# Patient Record
Sex: MALE | Race: WHITE | NOT HISPANIC OR LATINO | Employment: OTHER | URBAN - NONMETROPOLITAN AREA
[De-identification: names, ages, dates, MRNs, and addresses within clinical notes are randomized per-mention and may not be internally consistent; named-entity substitution may affect disease eponyms.]

---

## 2017-01-10 RX ORDER — OXYCODONE AND ACETAMINOPHEN 10; 325 MG/1; MG/1
1 TABLET ORAL EVERY 6 HOURS PRN
Qty: 120 TABLET | Refills: 0 | Status: SHIPPED | OUTPATIENT
Start: 2017-01-10 | End: 2017-02-07 | Stop reason: SDUPTHER

## 2017-02-07 RX ORDER — OXYCODONE AND ACETAMINOPHEN 10; 325 MG/1; MG/1
1 TABLET ORAL EVERY 6 HOURS PRN
Qty: 120 TABLET | Refills: 0 | Status: SHIPPED | OUTPATIENT
Start: 2017-02-07 | End: 2017-03-09 | Stop reason: SDUPTHER

## 2017-02-16 RX ORDER — QUETIAPINE FUMARATE 50 MG/1
50 TABLET, FILM COATED ORAL NIGHTLY
Qty: 30 TABLET | Refills: 5 | Status: SHIPPED | OUTPATIENT
Start: 2017-02-16 | End: 2017-09-06 | Stop reason: SDUPTHER

## 2017-02-23 RX ORDER — AZITHROMYCIN 250 MG/1
TABLET, FILM COATED ORAL
Qty: 6 TABLET | Refills: 0 | Status: SHIPPED | OUTPATIENT
Start: 2017-02-23 | End: 2017-03-09

## 2017-03-09 ENCOUNTER — OFFICE VISIT (OUTPATIENT)
Dept: FAMILY MEDICINE CLINIC | Facility: CLINIC | Age: 72
End: 2017-03-09

## 2017-03-09 VITALS
SYSTOLIC BLOOD PRESSURE: 122 MMHG | HEIGHT: 70 IN | BODY MASS INDEX: 21.88 KG/M2 | DIASTOLIC BLOOD PRESSURE: 80 MMHG | WEIGHT: 152.8 LBS

## 2017-03-09 DIAGNOSIS — G20 PARKINSON'S DISEASE (HCC): ICD-10-CM

## 2017-03-09 DIAGNOSIS — D51.0 PERNICIOUS ANEMIA: Primary | ICD-10-CM

## 2017-03-09 DIAGNOSIS — M25.50 MULTIPLE JOINT PAIN: ICD-10-CM

## 2017-03-09 PROCEDURE — 99214 OFFICE O/P EST MOD 30 MIN: CPT | Performed by: FAMILY MEDICINE

## 2017-03-09 RX ORDER — CARVEDILOL 3.12 MG/1
3.12 TABLET ORAL 2 TIMES DAILY WITH MEALS
Qty: 60 TABLET | Refills: 5 | Status: SHIPPED | OUTPATIENT
Start: 2017-03-09 | End: 2017-08-01 | Stop reason: SDUPTHER

## 2017-03-09 RX ORDER — OXYCODONE AND ACETAMINOPHEN 10; 325 MG/1; MG/1
1 TABLET ORAL EVERY 6 HOURS PRN
Qty: 120 TABLET | Refills: 0 | Status: SHIPPED | OUTPATIENT
Start: 2017-03-09 | End: 2017-04-04 | Stop reason: SDUPTHER

## 2017-03-09 NOTE — PROGRESS NOTES
Subjective   Edashkan Gentile is a 71 y.o. male.  He is here for chronic parkinsonism, chronic joint pain including back and shoulder pain.  Overall his condition continues to decline.  He is receiving home health services.  He has been on B12 supplements for years and is still consistently anemic.  He has been taking oral B12, may need B12 shots however as his mental and physical status decline.  His wife says he has a good appetite but has continued to have weight loss or at least poor weight gain.  He is unable to this point to really carry out any of his activities of daily living.  She said he may try to feed himself a bite or 2 but she has to finish feeding him his meals.  He had some recent medication changes in the hospital.  There was some confusion about exactly what was happening is she says he was prepped and shaved for a coronary artery catheterization but when he got to the Cath Lab evidently the procedure was canceled.  She has an appointment scheduled with his regular cardiologist    Some of his blood pressure medications were changed including the discontinuation of enalapril and Lasix and he was prescribed carvedilol.`    History of Present Illness     The following portions of the patient's history were reviewed and updated as appropriate: allergies, current medications, past family history, past medical history, past social history, past surgical history and problem list.    Review of Systems   Constitutional: Negative for activity change, appetite change, diaphoresis, fatigue, fever and unexpected weight change.   HENT: Negative for congestion, ear pain, hearing loss, sinus pressure, sore throat, tinnitus, trouble swallowing and voice change.    Eyes: Negative.    Respiratory: Negative.    Cardiovascular: Negative.    Gastrointestinal: Negative for abdominal distention, abdominal pain, blood in stool, constipation, diarrhea, nausea and vomiting.   Endocrine: Negative.    Genitourinary:  Negative for decreased urine volume, dysuria, frequency, hematuria and urgency.   Musculoskeletal: Negative.    Skin: Negative.    Allergic/Immunologic: Negative.    Neurological: Negative for dizziness, tremors, seizures, syncope, speech difficulty, weakness, numbness and headaches.   Hematological: Negative.    Psychiatric/Behavioral: Negative for dysphoric mood and sleep disturbance. The patient is not nervous/anxious.      Past Medical History   Diagnosis Date   • Acute upper respiratory infection    • Adhesive capsulitis of shoulder    • Backache    • Cervical radiculopathy    • Degeneration of lumbar intervertebral disc    • Dementia    • Drug-induced orofacial dyskinesia    • Encounter for screening for malignant neoplasm of prostate    • Hyperlipidemia    • Hypotension    • Low back pain    • Mouth ulcer    • Osteoarthritis of acromioclavicular joint    • Parkinson's disease    • Upper respiratory infection    • Urinary incontinence    • Vitamin B12 deficiency (non anemic)    • Vitamin D deficiency      No Known Allergies    Current Outpatient Prescriptions:   •  aspirin 81 MG EC tablet, Take 81 mg by mouth daily., Disp: , Rfl:   •  carbidopa-levodopa CR (SINEMET CR)  MG per CR tablet, Take 1 tablet by mouth 4 (four) times a day., Disp: , Rfl:   •  clopidogrel (PLAVIX) 75 MG tablet, Take 75 mg by mouth daily., Disp: , Rfl:   •  Cyanocobalamin (VITAMIN B-12 CR PO), Take  by mouth daily., Disp: , Rfl:   •  diclofenac (VOLTAREN) 1 % gel gel, Apply 4 g topically 4 (four) times a day. Apply to affected joints., Disp: , Rfl:   •  finasteride (PROSCAR) 5 MG tablet, , Disp: , Rfl: 11  •  lansoprazole (PREVACID) 30 MG capsule, TAKE 1 CAPSULE BY MOUTH EVERY DAY, Disp: 30 capsule, Rfl: 5  •  lidocaine (LIDODERM) 5 %, Place  on the skin. wear up to 3 patches daily for 12 hours, Disp: , Rfl:   •  linaclotide (LINZESS) 290 MCG capsule capsule, Take 290 mcg by mouth Daily., Disp: 30 capsule, Rfl: 5  •   methylcellulose, Laxative, (CITRUCEL) 500 MG tablet tablet, Take 1 tablet by mouth daily., Disp: , Rfl:   •  nitroglycerin (NITROSTAT) 0.4 MG SL tablet, Place  under the tongue. 1 tablet(s) sublingual as needed for chest pain (may repeat every 5 minutes but seek medical help if pain persists after 3 tablets), Disp: , Rfl:   •  olopatadine (PATADAY) 0.2 % solution ophthalmic solution, Administer 1 drop to both eyes 2 (two) times a day., Disp: , Rfl:   •  ondansetron (ZOFRAN) 4 MG tablet, Take 4-8 mg by mouth every 4 (four) hours as needed for nausea., Disp: , Rfl:   •  oxyCODONE-acetaminophen (PERCOCET)  MG per tablet, Take 1 tablet by mouth Every 6 (Six) Hours As Needed for moderate pain (4-6)., Disp: 120 tablet, Rfl: 0  •  pravastatin (PRAVACHOL) 40 MG tablet, TAKE 1 TABLET BY MOUTH EVERY OTHER DAY, Disp: 15 tablet, Rfl: 5  •  QUEtiapine (SEROquel) 50 MG tablet, Take 1 tablet by mouth Every Night., Disp: 30 tablet, Rfl: 5  •  silodosin (RAPAFLO) 8 MG capsule capsule, Take  by mouth., Disp: , Rfl:   •  vitamin D (ERGOCALCIFEROL) 91904 UNITS capsule capsule, TAKE 1 CAPSULE BY MOUTH ONCE A WEEK, Disp: 4 capsule, Rfl: 5  •  carvedilol (COREG) 3.125 MG tablet, Take 1 tablet by mouth 2 (Two) Times a Day With Meals., Disp: 60 tablet, Rfl: 5    Objective   Physical Exam   Constitutional: He is oriented to person, place, and time. He appears well-developed and well-nourished. No distress.   HENT:   Head: Normocephalic and atraumatic.   Nose: Nose normal.   Mouth/Throat: Oropharynx is clear and moist.   Eyes: Conjunctivae and EOM are normal. Pupils are equal, round, and reactive to light.   Neck: Normal range of motion. Neck supple. No JVD present. No tracheal deviation present. No thyromegaly present.   Cardiovascular: Normal rate, regular rhythm, normal heart sounds and intact distal pulses.    No murmur heard.  Pulmonary/Chest: Effort normal and breath sounds normal. He has no wheezes. He exhibits no tenderness.    Abdominal: Soft. Bowel sounds are normal. He exhibits no distension and no mass. There is no tenderness.   Musculoskeletal: Normal range of motion. He exhibits no edema or tenderness.   Lymphadenopathy:     He has no cervical adenopathy.   Neurological: He is alert and oriented to person, place, and time. He exhibits normal muscle tone. Coordination normal.   Skin: Skin is warm and dry. No rash noted. There is erythema. No pallor.   Psychiatric: He has a normal mood and affect.   Nursing note and vitals reviewed.      Assessment/Plan   Landry was seen today for follow-up.    Diagnoses and all orders for this visit:    Pernicious anemia    Parkinson's disease    Multiple joint pain    Other orders  -     oxyCODONE-acetaminophen (PERCOCET)  MG per tablet; Take 1 tablet by mouth Every 6 (Six) Hours As Needed for moderate pain (4-6).  -     carvedilol (COREG) 3.125 MG tablet; Take 1 tablet by mouth 2 (Two) Times a Day With Meals.    The patient has read and signed the [facility] Controlled Substance Contract.  I will continue to see patient for regular follow up appointments. Patient is well controlled on the medication.  JOSE RAFAEL has been reviewed by me and is updated every 3 months. The patient is aware of the potential for addiction and dependence.     Continue pain management with the Percocet.  His wife does dispense them when needed    We'll continue with carvedilol until he sees his regular cardiologist who may wish to make some changes.    I recommend that he have the B12 shots for the chronic macrocytic or pernicious anemia.  He has been on long-term PPIs and still needs to be and this may be part of the source of the pernicious anemia.  Nonetheless the oral supplements that are sublingual are more difficult for him to take now given his Parkinson's disease.  He has a constant rolling and resting of the tongue and also poor understanding both of which make it difficult for him to take a sublingual  tablet.    I recommend a transport chair for him to be taken back and forth to doctor's visits and other places where transportation as necessary.  He has poor upper body muscle control and is at a significant fall risk and may also slip out of regular chairs at times.  He has some tremoring related to the parkinsonism as well and all of these things make it difficult for him to sit in a regular car seat with the seatbelt safely

## 2017-04-04 RX ORDER — OXYCODONE AND ACETAMINOPHEN 10; 325 MG/1; MG/1
1 TABLET ORAL EVERY 6 HOURS PRN
Qty: 120 TABLET | Refills: 0 | Status: SHIPPED | OUTPATIENT
Start: 2017-04-04 | End: 2017-05-01 | Stop reason: SDUPTHER

## 2017-04-05 RX ORDER — PRAVASTATIN SODIUM 40 MG
TABLET ORAL
Qty: 15 TABLET | Refills: 5 | Status: SHIPPED | OUTPATIENT
Start: 2017-04-05 | End: 2017-10-09 | Stop reason: SDUPTHER

## 2017-04-05 RX ORDER — CYANOCOBALAMIN 1000 UG/ML
INJECTION, SOLUTION INTRAMUSCULAR; SUBCUTANEOUS
Qty: 2 ML | Refills: 1 | Status: SHIPPED | OUTPATIENT
Start: 2017-04-05 | End: 2017-06-19 | Stop reason: SDUPTHER

## 2017-05-02 RX ORDER — OXYCODONE AND ACETAMINOPHEN 10; 325 MG/1; MG/1
1 TABLET ORAL EVERY 6 HOURS PRN
Qty: 120 TABLET | Refills: 0 | Status: SHIPPED | OUTPATIENT
Start: 2017-05-02 | End: 2017-06-08 | Stop reason: SDUPTHER

## 2017-05-03 RX ORDER — LANSOPRAZOLE 30 MG/1
CAPSULE, DELAYED RELEASE ORAL
Qty: 30 CAPSULE | Refills: 5 | Status: SHIPPED | OUTPATIENT
Start: 2017-05-03 | End: 2017-12-01 | Stop reason: SDUPTHER

## 2017-05-03 RX ORDER — ERGOCALCIFEROL 1.25 MG/1
CAPSULE ORAL
Qty: 4 CAPSULE | Refills: 5 | Status: SHIPPED | OUTPATIENT
Start: 2017-05-03 | End: 2017-11-03 | Stop reason: SDUPTHER

## 2017-06-08 ENCOUNTER — OFFICE VISIT (OUTPATIENT)
Dept: FAMILY MEDICINE CLINIC | Facility: CLINIC | Age: 72
End: 2017-06-08

## 2017-06-08 VITALS
SYSTOLIC BLOOD PRESSURE: 126 MMHG | HEIGHT: 70 IN | DIASTOLIC BLOOD PRESSURE: 70 MMHG | WEIGHT: 156.4 LBS | BODY MASS INDEX: 22.39 KG/M2

## 2017-06-08 DIAGNOSIS — G20 PARKINSON'S DISEASE (HCC): ICD-10-CM

## 2017-06-08 DIAGNOSIS — M51.36 DEGENERATION OF LUMBAR INTERVERTEBRAL DISC: Primary | ICD-10-CM

## 2017-06-08 PROCEDURE — 99214 OFFICE O/P EST MOD 30 MIN: CPT | Performed by: FAMILY MEDICINE

## 2017-06-08 RX ORDER — OXYCODONE AND ACETAMINOPHEN 10; 325 MG/1; MG/1
1 TABLET ORAL EVERY 6 HOURS PRN
Qty: 120 TABLET | Refills: 0 | Status: SHIPPED | OUTPATIENT
Start: 2017-06-08 | End: 2017-06-29 | Stop reason: SDUPTHER

## 2017-06-08 NOTE — PROGRESS NOTES
Subjective   Edashkan Gentile is a 72 y.o. male who presents to the office for follow-up on arthritis and lumbar degenerative disc disease.  Needs refill of pain medication, which his wife administers to him.  His Parkinson's symptoms have progressed.  His wife wonders if the Sinemet should be increased but has an appointment scheduled with his neurologist to discuss this.  He continues to have very poor coordination    History of Present Illness     The following portions of the patient's history were reviewed and updated as appropriate: allergies, current medications, past family history, past medical history, past social history, past surgical history and problem list.    Review of Systems   Constitutional: Positive for fatigue. Negative for activity change, appetite change, diaphoresis, fever and unexpected weight change.   HENT: Negative for congestion, ear pain, hearing loss, sinus pressure, sore throat, tinnitus, trouble swallowing and voice change.    Eyes: Negative.    Respiratory: Negative.    Cardiovascular: Negative.    Gastrointestinal: Negative for abdominal distention, abdominal pain, blood in stool, constipation, diarrhea, nausea and vomiting.   Endocrine: Negative.    Genitourinary: Negative for decreased urine volume, dysuria, frequency, hematuria and urgency.   Musculoskeletal: Positive for arthralgias, back pain, gait problem and joint swelling.   Skin: Negative.  Negative for rash.   Allergic/Immunologic: Negative.    Neurological: Positive for tremors and weakness. Negative for dizziness, seizures, syncope, speech difficulty, numbness and headaches.   Hematological: Negative.    Psychiatric/Behavioral: Positive for confusion, decreased concentration and dysphoric mood. Negative for sleep disturbance. The patient is nervous/anxious.        Objective   Physical Exam   Constitutional: He appears well-developed and well-nourished. No distress.   HENT:   Head: Normocephalic and atraumatic.    Nose: Nose normal.   Mouth/Throat: Oropharynx is clear and moist.   Eyes: Conjunctivae and EOM are normal. Pupils are equal, round, and reactive to light.   Neck: Normal range of motion. Neck supple. No JVD present. No tracheal deviation present. No thyromegaly present.   Cardiovascular: Normal rate, regular rhythm, normal heart sounds and intact distal pulses.    No murmur heard.  Pulmonary/Chest: Effort normal and breath sounds normal. He has no wheezes. He exhibits no tenderness.   Abdominal: Soft. Bowel sounds are normal. He exhibits no distension and no mass. There is no tenderness.   Musculoskeletal: He exhibits no edema or tenderness.   Cogwheel rigidity, gait stiff, shuffling   Lymphadenopathy:     He has no cervical adenopathy.   Neurological: He is alert. He exhibits normal muscle tone. Coordination normal.   Skin: Skin is warm and dry. No rash noted. No erythema. No pallor.   Psychiatric:   Flat affect   Nursing note and vitals reviewed.      Assessment/Plan   Landry was seen today for follow-up and med refill.    Diagnoses and all orders for this visit:    Degeneration of lumbar intervertebral disc    Parkinson's disease    Other orders  -     oxyCODONE-acetaminophen (PERCOCET)  MG per tablet; Take 1 tablet by mouth Every 6 (Six) Hours As Needed for Moderate Pain (4-6).    The patient has read and signed the [facility] Controlled Substance Contract.  I will continue to see patient for regular follow up appointments. Patient is well controlled on the medication.  JOSE RAFAEL has been reviewed by me and is updated every 3 months. The patient is aware of the potential for addiction and dependence.   His wife will continue to Mr. the pain medicine to him as needed.  They will follow-up with neurology regarding progressive Parkinson's issues and Sinemet dosage       ]

## 2017-06-19 RX ORDER — CYANOCOBALAMIN 1000 UG/ML
INJECTION, SOLUTION INTRAMUSCULAR; SUBCUTANEOUS
Qty: 2 ML | Refills: 5 | Status: SHIPPED | OUTPATIENT
Start: 2017-06-19 | End: 2017-07-13 | Stop reason: SDUPTHER

## 2017-06-29 RX ORDER — OXYCODONE AND ACETAMINOPHEN 10; 325 MG/1; MG/1
1 TABLET ORAL EVERY 6 HOURS PRN
Qty: 120 TABLET | Refills: 0 | Status: SHIPPED | OUTPATIENT
Start: 2017-06-29 | End: 2017-08-01 | Stop reason: SDUPTHER

## 2017-07-13 RX ORDER — CYANOCOBALAMIN 1000 UG/ML
1000 INJECTION, SOLUTION INTRAMUSCULAR; SUBCUTANEOUS
Qty: 2 ML | Refills: 5 | Status: SHIPPED | OUTPATIENT
Start: 2017-07-13 | End: 2017-12-01 | Stop reason: SDUPTHER

## 2017-08-01 RX ORDER — OXYCODONE AND ACETAMINOPHEN 10; 325 MG/1; MG/1
1 TABLET ORAL EVERY 6 HOURS PRN
Qty: 120 TABLET | Refills: 0 | Status: SHIPPED | OUTPATIENT
Start: 2017-08-01 | End: 2017-08-28 | Stop reason: SDUPTHER

## 2017-08-01 RX ORDER — CARVEDILOL 3.12 MG/1
TABLET ORAL
Qty: 60 TABLET | Refills: 5 | Status: SHIPPED | OUTPATIENT
Start: 2017-08-01 | End: 2018-01-29 | Stop reason: SDUPTHER

## 2017-08-28 RX ORDER — OXYCODONE AND ACETAMINOPHEN 10; 325 MG/1; MG/1
1 TABLET ORAL EVERY 6 HOURS PRN
Qty: 120 TABLET | Refills: 0 | Status: SHIPPED | OUTPATIENT
Start: 2017-08-28 | End: 2017-09-26 | Stop reason: SDUPTHER

## 2017-09-06 ENCOUNTER — OFFICE VISIT (OUTPATIENT)
Dept: FAMILY MEDICINE CLINIC | Facility: CLINIC | Age: 72
End: 2017-09-06

## 2017-09-06 VITALS
DIASTOLIC BLOOD PRESSURE: 80 MMHG | BODY MASS INDEX: 22.33 KG/M2 | SYSTOLIC BLOOD PRESSURE: 138 MMHG | WEIGHT: 156 LBS | HEIGHT: 70 IN

## 2017-09-06 DIAGNOSIS — L60.0 INGROWN RIGHT BIG TOENAIL: Primary | ICD-10-CM

## 2017-09-06 DIAGNOSIS — G20 PARKINSON'S DISEASE (HCC): ICD-10-CM

## 2017-09-06 DIAGNOSIS — G62.9 NEUROPATHY: ICD-10-CM

## 2017-09-06 PROCEDURE — 99214 OFFICE O/P EST MOD 30 MIN: CPT | Performed by: FAMILY MEDICINE

## 2017-09-06 RX ORDER — GABAPENTIN 100 MG/1
100 CAPSULE ORAL 3 TIMES DAILY
Qty: 90 CAPSULE | Refills: 2 | Status: SHIPPED | OUTPATIENT
Start: 2017-09-06 | End: 2017-12-06 | Stop reason: SDUPTHER

## 2017-09-06 RX ORDER — QUETIAPINE FUMARATE 50 MG/1
TABLET, FILM COATED ORAL
Qty: 30 TABLET | Refills: 5 | Status: SHIPPED | OUTPATIENT
Start: 2017-09-06 | End: 2018-02-28 | Stop reason: SDUPTHER

## 2017-09-06 NOTE — PROGRESS NOTES
Subjective   Edashkan Gentile is a 72 y.o. male who presents to the office for follow-up on parkinsonism, chronic pain issues.  Needs refills of medication.  They're no longer seeing the neurologist as his wife said he would range between his dose of Sinemet 25/100 and then get symptoms that have been increased to 50/200 and an end up hospitalized and have the medicine back down to the previous dose and this was a pattern that was repeated over and over and it just didn't seem to be working.     He's having increasing symptoms of dementia stiffness and overall parkinsonism.  His wife is still taking care of him by herself at home.  They do have home health.  Home health was been working on his toenail and his wife is afraid it is going to come off.  He seems to have some pain in it as well.  He's also been moving his fingers a lot and his wife said that he complains that they burn all the time.    History of Present Illness   Arthritis   Presents for follow-up visit. Complains of pain, stiffness and joint swelling, reports no joint warmth. The symptoms have been worsening. Affected location: diffuse. Pain is at a severity of 8/10. Associated symptoms include fatigue, pain at night and pain while resting. Pertinent negatives include no diarrhea, dry eyes, dry mouth, dysuria, fever, rash, Raynaud's syndrome, uveitis or weight loss. Compliance with total regimen is %. Compliance with medications is %.       The following portions of the patient's history were reviewed and updated as appropriate: allergies, current medications, past family history, past medical history, past social history, past surgical history and problem list.    Review of Systems   Constitutional: Positive for fatigue. Negative for activity change, appetite change, diaphoresis, fever and unexpected weight change.   HENT: Negative for congestion, ear pain, hearing loss, sinus pressure, sore throat, tinnitus, trouble swallowing and  voice change.    Eyes: Negative.    Respiratory: Negative.    Cardiovascular: Negative.    Gastrointestinal: Negative for abdominal distention, abdominal pain, blood in stool, constipation, diarrhea, nausea and vomiting.   Endocrine: Negative.    Genitourinary: Negative for decreased urine volume, dysuria, frequency, hematuria and urgency.   Musculoskeletal: Positive for arthralgias, back pain, gait problem and joint swelling.   Skin: Negative.  Negative for rash.   Allergic/Immunologic: Negative.    Neurological: Positive for tremors and weakness. Negative for dizziness, seizures, syncope, speech difficulty, numbness and headaches.   Hematological: Negative.    Psychiatric/Behavioral: Positive for confusion, decreased concentration and dysphoric mood. Negative for sleep disturbance. The patient is nervous/anxious.        Objective   Physical Exam   Constitutional: He appears well-developed and well-nourished. No distress.   HENT:   Head: Normocephalic and atraumatic.   Nose: Nose normal.   Mouth/Throat: Oropharynx is clear and moist.   Eyes: Conjunctivae and EOM are normal. Pupils are equal, round, and reactive to light.   Neck: Normal range of motion. Neck supple. No JVD present. No tracheal deviation present. No thyromegaly present.   Cardiovascular: Normal rate, regular rhythm, normal heart sounds and intact distal pulses.    No murmur heard.  Pulmonary/Chest: Effort normal and breath sounds normal. He has no wheezes. He exhibits no tenderness.   Abdominal: Soft. Bowel sounds are normal. He exhibits no distension and no mass. There is no tenderness.   Musculoskeletal: He exhibits no edema or tenderness.   Cogwheel rigidity, gait stiff, shuffling    His right great toenail is loose at the base, very thickened hypertrophied with redness and ingrowing noted both medially and laterally   Lymphadenopathy:     He has no cervical adenopathy.   Neurological: He is alert. He exhibits normal muscle tone. Coordination  normal.   Skin: Skin is warm and dry. No rash noted. No erythema. No pallor.   Psychiatric:   Flat affect   Nursing note and vitals reviewed.      Assessment/Plan   Landry was seen today for med refill.    Diagnoses and all orders for this visit:    Ingrown right big toenail  -     Ambulatory Referral to Podiatry    Parkinson's disease    Neuropathy    Other orders  -     gabapentin (NEURONTIN) 100 MG capsule; Take 1 capsule by mouth 3 (Three) Times a Day.  -     carbidopa-levodopa (SINEMET)  MG per tablet; 1 tablet up to 6 times a day as directed    The patient has read and signed the [facility] Controlled Substance Contract.  I will continue to see patient for regular follow up appointments. Patient is well controlled on the medication.  JOSE RAFAEL has been reviewed by me and is updated every 3 months. The patient is aware of the potential for addiction and dependence.   We will go up on the Sinemet to somewhere in the middle between the 2 doses depending on his symptoms and hopefully avoid having him overtreated, recheck in a month if not improving    We'll add gabapentin for the burning type pain in the hands and will refer to podiatry for the ingrown and loose toenail as it likely needs to come off       ]

## 2017-09-13 ENCOUNTER — OFFICE VISIT (OUTPATIENT)
Dept: PODIATRY | Facility: CLINIC | Age: 72
End: 2017-09-13

## 2017-09-13 VITALS
HEIGHT: 70 IN | HEART RATE: 56 BPM | SYSTOLIC BLOOD PRESSURE: 95 MMHG | WEIGHT: 156 LBS | BODY MASS INDEX: 22.33 KG/M2 | DIASTOLIC BLOOD PRESSURE: 58 MMHG

## 2017-09-13 DIAGNOSIS — L60.0 INGROWN TOENAIL: Primary | ICD-10-CM

## 2017-09-13 DIAGNOSIS — M79.674 GREAT TOE PAIN, RIGHT: ICD-10-CM

## 2017-09-13 PROCEDURE — 99202 OFFICE O/P NEW SF 15 MIN: CPT | Performed by: PODIATRIST

## 2017-09-13 PROCEDURE — 11730 AVULSION NAIL PLATE SIMPLE 1: CPT | Performed by: PODIATRIST

## 2017-09-13 NOTE — PROGRESS NOTES
Landry Gentile  1945  72 y.o. male     Patient presents today with a concern about his right great toenail and some other thick hard to cut nails.    9/13/17    Chief Complaint   Patient presents with   • Left Foot - nail issue   • Right Foot - nail issue           History of Present Illness    72-year-old male presents to clinic today with chief complaint of right great toe pain.  Pain is located to the toenail.  He is accompanied by family member who is a primary historian.  Patient has history of Parkinson's and several mini strokes. She states that it has been present for greater than one month.  He complains of pain especially when shoe gear.  They have done nothing to treat it.  He has no other pedal complaints.         Past Medical History:   Diagnosis Date   • Acute upper respiratory infection    • Adhesive capsulitis of shoulder    • Backache    • Cervical radiculopathy    • Degeneration of lumbar intervertebral disc    • Dementia    • Drug-induced orofacial dyskinesia    • Encounter for screening for malignant neoplasm of prostate    • Hyperlipidemia    • Hypotension    • Low back pain    • Mouth ulcer    • Osteoarthritis of acromioclavicular joint    • Parkinson's disease    • Upper respiratory infection    • Urinary incontinence    • Vitamin B12 deficiency (non anemic)    • Vitamin D deficiency          Past Surgical History:   Procedure Laterality Date   • COLONOSCOPY  06/25/2012    Internal & external hemorrhoids found.   • CORONARY ARTERY BYPASS GRAFT  1996   • ENDOSCOPY  06/25/2012    Esophagitis seen. Biopsy taken. Gastritis in stomach. Biopsy taken. Normal duodenum. Biopsy taken.   • LUMBAR LAMINECTOMY  04/14/2011   • PROCEDURE GENERIC CONVERTED  07/21/2015    TOBACCO NON-USER          No family history on file.      Social History     Social History   • Marital status:      Spouse name: N/A   • Number of children: N/A   • Years of education: N/A     Occupational History   • Not on  file.     Social History Main Topics   • Smoking status: Never Smoker   • Smokeless tobacco: Never Used   • Alcohol use No   • Drug use: No   • Sexual activity: Not on file     Other Topics Concern   • Not on file     Social History Narrative         Current Outpatient Prescriptions   Medication Sig Dispense Refill   • aspirin 81 MG EC tablet Take 81 mg by mouth daily.     • carbidopa-levodopa (SINEMET)  MG per tablet 1 tablet up to 6 times a day as directed 180 tablet 5   • carvedilol (COREG) 3.125 MG tablet TAKE 1 TABLET BY MOUTH TWO TIMES A DAY WITH MEALS 60 tablet 5   • clopidogrel (PLAVIX) 75 MG tablet Take 75 mg by mouth daily.     • Cyanocobalamin (VITAMIN B-12 CR PO) Take  by mouth daily.     • cyanocobalamin 1000 MCG/ML injection Inject 1 mL into the shoulder, thigh, or buttocks Every 14 (Fourteen) Days. 2 mL 5   • diclofenac (VOLTAREN) 1 % gel gel Apply 4 g topically 4 (four) times a day. Apply to affected joints.     • finasteride (PROSCAR) 5 MG tablet   11   • gabapentin (NEURONTIN) 100 MG capsule Take 1 capsule by mouth 3 (Three) Times a Day. 90 capsule 2   • lansoprazole (PREVACID) 30 MG capsule TAKE 1 CAPSULE BY MOUTH EVERY DAY 30 capsule 5   • lidocaine (LIDODERM) 5 % Place  on the skin. wear up to 3 patches daily for 12 hours     • linaclotide (LINZESS) 290 MCG capsule capsule Take 290 mcg by mouth Daily. 30 capsule 5   • methylcellulose, Laxative, (CITRUCEL) 500 MG tablet tablet Take 1 tablet by mouth daily.     • nitroglycerin (NITROSTAT) 0.4 MG SL tablet Place  under the tongue. 1 tablet(s) sublingual as needed for chest pain (may repeat every 5 minutes but seek medical help if pain persists after 3 tablets)     • olopatadine (PATADAY) 0.2 % solution ophthalmic solution Administer 1 drop to both eyes 2 (two) times a day.     • ondansetron (ZOFRAN) 4 MG tablet Take 4-8 mg by mouth every 4 (four) hours as needed for nausea.     • oxyCODONE-acetaminophen (PERCOCET)  MG per tablet Take 1  "tablet by mouth Every 6 (Six) Hours As Needed for Moderate Pain . 120 tablet 0   • pravastatin (PRAVACHOL) 40 MG tablet TAKE 1 TABLET BY MOUTH EVERY OTHER DAY 15 tablet 5   • QUEtiapine (SEROquel) 50 MG tablet TAKE 1 TABLET BY MOUTH EVERY NIGHT 30 tablet 5   • silodosin (RAPAFLO) 8 MG capsule capsule Take  by mouth.     • Syringe/Needle, Disp, (SYRINGE LUER LOCK) 25G X 1\" 3 ML misc Use to inject vitamin b 12 2 each 5   • vitamin D (ERGOCALCIFEROL) 27273 UNITS capsule capsule TAKE 1 CAPSULE BY MOUTH ONCE A WEEK 4 capsule 5     No current facility-administered medications for this visit.          OBJECTIVE    BP 95/58  Pulse 56  Ht 70\" (177.8 cm)  Wt 156 lb (70.8 kg)  BMI 22.38 kg/m2      Review of Systems   Constitutional: Negative for chills and fever.   Cardiovascular: Negative for chest pain.   Gastrointestinal: Negative for constipation, diarrhea, nausea and vomiting.   Skin: Negative for wound.  right great toenail pain  Musculoskeletal: foot pain      Constitutional: well developed, well nourished    HEENT: Normocephalic and atraumatic, normal hearing    Respiratory: Non labored respirations noted    Cardiovascular:    DP/PT pulses palpable    CFT brisk  to all digits  Skin temp is warm to warm from proximal tibia to distal digits  Pedal hair growth present.     Musculoskeletal:  Pain on palpation to the right hallux nail  Rectus foot type     Dermatological:   Right hallux nail is thickened, discolored.  It is tender to palpation.  There is mild surrounding erythema.  No purulent drainage or foul odors noted.  Skin is warm, dry and intact    Webspaces 1-4 bilateral are clean, dry and intact.   No subcutaneous nodules or masses noted    No open wounds noted     Neurological:   Sensation intact to light touch    DTR intact    Psychiatric: Alert, NAD.     Nail Removal  Date/Time: 9/13/2017 12:28 PM  Performed by: BRANDON CARRERA  Authorized by: BRANDON CARRERA   Consent: Verbal consent obtained. Written " consent obtained.  Risks and benefits: risks, benefits and alternatives were discussed  Consent given by: guardian  Patient understanding: patient states understanding of the procedure being performed  Nail removal extremity: right hallux nail.  Anesthesia: digital block    Anesthesia:  Local Anesthetic: lidocaine 2% without epinephrine  Amount removed: complete (Nail plate was  from underlying nailbed with blunt dissection and removed with a hemostat)  Nail matrix removed: none  Dressing: antibiotic ointment and dressing applied  Patient tolerance: Patient tolerated the procedure well with no immediate complications              ASSESSMENT AND PLAN    Edward was seen today for nail issue and nail issue.    Diagnoses and all orders for this visit:    Ingrown toenail    Great toe pain, right    - Comprehensive foot and ankle exam performed  - Diagnosis, prevention and treatment of ingrown toenails discussed with patient, including risks and potential benefits of nail avulsion both temporary and permanent versus simple debridement.  - Patient elected for a total temporary nail avulsion  - Dispensed aftercare instruction sheet  - All questions were answered and the patient is in agreement with the current treatment plan.  - RTC in 2 weeks          This document has been electronically signed by Mo Ann DPM on September 15, 2017 12:26 PM     9/15/2017  12:26 PM    EMR Dragon/Transcription disclaimer:   Much of this encounter note is an electronic transcription/translation of spoken language to printed text. The electronic translation of spoken language may permit erroneous, or at times, nonsensical words or phrases to be inadvertently transcribed; Although I have reviewed the note for such errors, some may still exist.

## 2017-09-26 RX ORDER — OXYCODONE AND ACETAMINOPHEN 10; 325 MG/1; MG/1
1 TABLET ORAL EVERY 6 HOURS PRN
Qty: 120 TABLET | Refills: 0 | Status: SHIPPED | OUTPATIENT
Start: 2017-09-26 | End: 2017-10-26 | Stop reason: SDUPTHER

## 2017-09-27 ENCOUNTER — OFFICE VISIT (OUTPATIENT)
Dept: PODIATRY | Facility: CLINIC | Age: 72
End: 2017-09-27

## 2017-09-27 VITALS — WEIGHT: 156 LBS | HEIGHT: 70 IN | BODY MASS INDEX: 22.33 KG/M2

## 2017-09-27 DIAGNOSIS — L60.0 INGROWN TOENAIL: Primary | ICD-10-CM

## 2017-09-27 PROCEDURE — 99212 OFFICE O/P EST SF 10 MIN: CPT | Performed by: PODIATRIST

## 2017-09-27 NOTE — PROGRESS NOTES
Landry Gentile  1945  72 y.o. male     Patient presents today for follow-up of right great toe.    9/27/17    Chief Complaint   Patient presents with   • Right Foot - Follow-up           History of Present Illness    Patient presents to clinic today for follow-up of his right great toenail avulsion.  He has no pain today.  He has no new pedal complaints.        Past Medical History:   Diagnosis Date   • Acute upper respiratory infection    • Adhesive capsulitis of shoulder    • Backache    • Cervical radiculopathy    • Degeneration of lumbar intervertebral disc    • Dementia    • Drug-induced orofacial dyskinesia    • Encounter for screening for malignant neoplasm of prostate    • Hyperlipidemia    • Hypotension    • Low back pain    • Mouth ulcer    • Osteoarthritis of acromioclavicular joint    • Parkinson's disease    • Upper respiratory infection    • Urinary incontinence    • Vitamin B12 deficiency (non anemic)    • Vitamin D deficiency          Past Surgical History:   Procedure Laterality Date   • COLONOSCOPY  06/25/2012    Internal & external hemorrhoids found.   • CORONARY ARTERY BYPASS GRAFT  1996   • ENDOSCOPY  06/25/2012    Esophagitis seen. Biopsy taken. Gastritis in stomach. Biopsy taken. Normal duodenum. Biopsy taken.   • LUMBAR LAMINECTOMY  04/14/2011   • PROCEDURE GENERIC CONVERTED  07/21/2015    TOBACCO NON-USER          No family history on file.      Social History     Social History   • Marital status:      Spouse name: N/A   • Number of children: N/A   • Years of education: N/A     Occupational History   • Not on file.     Social History Main Topics   • Smoking status: Never Smoker   • Smokeless tobacco: Never Used   • Alcohol use No   • Drug use: No   • Sexual activity: Not on file     Other Topics Concern   • Not on file     Social History Narrative         Current Outpatient Prescriptions   Medication Sig Dispense Refill   • aspirin 81 MG EC tablet Take 81 mg by mouth daily.    "  • carbidopa-levodopa (SINEMET)  MG per tablet 1 tablet up to 6 times a day as directed 180 tablet 5   • carvedilol (COREG) 3.125 MG tablet TAKE 1 TABLET BY MOUTH TWO TIMES A DAY WITH MEALS 60 tablet 5   • clopidogrel (PLAVIX) 75 MG tablet Take 75 mg by mouth daily.     • Cyanocobalamin (VITAMIN B-12 CR PO) Take  by mouth daily.     • cyanocobalamin 1000 MCG/ML injection Inject 1 mL into the shoulder, thigh, or buttocks Every 14 (Fourteen) Days. 2 mL 5   • diclofenac (VOLTAREN) 1 % gel gel Apply 4 g topically 4 (four) times a day. Apply to affected joints.     • finasteride (PROSCAR) 5 MG tablet   11   • gabapentin (NEURONTIN) 100 MG capsule Take 1 capsule by mouth 3 (Three) Times a Day. 90 capsule 2   • lansoprazole (PREVACID) 30 MG capsule TAKE 1 CAPSULE BY MOUTH EVERY DAY 30 capsule 5   • lidocaine (LIDODERM) 5 % Place  on the skin. wear up to 3 patches daily for 12 hours     • linaclotide (LINZESS) 290 MCG capsule capsule Take 290 mcg by mouth Daily. 30 capsule 5   • methylcellulose, Laxative, (CITRUCEL) 500 MG tablet tablet Take 1 tablet by mouth daily.     • nitroglycerin (NITROSTAT) 0.4 MG SL tablet Place  under the tongue. 1 tablet(s) sublingual as needed for chest pain (may repeat every 5 minutes but seek medical help if pain persists after 3 tablets)     • olopatadine (PATADAY) 0.2 % solution ophthalmic solution Administer 1 drop to both eyes 2 (two) times a day.     • ondansetron (ZOFRAN) 4 MG tablet Take 4-8 mg by mouth every 4 (four) hours as needed for nausea.     • oxyCODONE-acetaminophen (PERCOCET)  MG per tablet Take 1 tablet by mouth Every 6 (Six) Hours As Needed for Moderate Pain . 120 tablet 0   • pravastatin (PRAVACHOL) 40 MG tablet TAKE 1 TABLET BY MOUTH EVERY OTHER DAY 15 tablet 5   • QUEtiapine (SEROquel) 50 MG tablet TAKE 1 TABLET BY MOUTH EVERY NIGHT 30 tablet 5   • silodosin (RAPAFLO) 8 MG capsule capsule Take  by mouth.     • Syringe/Needle, Disp, (SYRINGE LUER LOCK) 25G X 1\" " "3 ML misc Use to inject vitamin b 12 2 each 5   • vitamin D (ERGOCALCIFEROL) 92165 UNITS capsule capsule TAKE 1 CAPSULE BY MOUTH ONCE A WEEK 4 capsule 5     No current facility-administered medications for this visit.          OBJECTIVE    Ht 70\" (177.8 cm)  Wt 156 lb (70.8 kg)  BMI 22.38 kg/m2      Review of Systems   Constitutional: Negative for chills and fever.   Cardiovascular: Negative for chest pain.   Gastrointestinal: Negative for constipation, diarrhea, nausea and vomiting.   Skin: Negative for wound.   Musculoskeletal: Negative foot pain      Constitutional: well developed, well nourished    HEENT: Normocephalic and atraumatic, normal hearing    Respiratory: Non labored respirations noted    Cardiovascular:    DP/PT pulses palpable    CFT brisk  to all digits  Skin temp is warm to warm from proximal tibia to distal digits  Pedal hair growth present.     Musculoskeletal:  No pain on palpation noted  Rectus foot type     Dermatological:   Right hallux nail is absent with no signs of infection  Skin is warm, dry and intact    No subcutaneous nodules or masses noted    No open wounds noted     Neurological:   Sensation intact to light touch    DTR intact    Psychiatric: Alert, NAD.     Procedures        ASSESSMENT AND PLAN    Edashkan was seen today for follow-up.    Diagnoses and all orders for this visit:    Ingrown toenail    - Continue soaking and dressing the toe until there is no drainage on the Band-Aid.  There is no drainage it is okay to discontinue soaks and dressings.  - All questions were answered   - RTC as needed          This document has been electronically signed by Mo Ann DPM on September 28, 2017 4:16 PM     9/28/2017  4:16 PM    EMR Dragon/Transcription disclaimer:   Much of this encounter note is an electronic transcription/translation of spoken language to printed text. The electronic translation of spoken language may permit erroneous, or at times, nonsensical words or phrases " to be inadvertently transcribed; Although I have reviewed the note for such errors, some may still exist.

## 2017-10-09 RX ORDER — LINACLOTIDE 290 UG/1
CAPSULE, GELATIN COATED ORAL
Qty: 30 CAPSULE | Refills: 5 | Status: SHIPPED | OUTPATIENT
Start: 2017-10-09 | End: 2018-03-29 | Stop reason: SDUPTHER

## 2017-10-09 RX ORDER — PRAVASTATIN SODIUM 40 MG
TABLET ORAL
Qty: 15 TABLET | Refills: 5 | Status: SHIPPED | OUTPATIENT
Start: 2017-10-09 | End: 2018-03-29 | Stop reason: SDUPTHER

## 2017-10-26 RX ORDER — OXYCODONE AND ACETAMINOPHEN 10; 325 MG/1; MG/1
1 TABLET ORAL EVERY 6 HOURS PRN
Qty: 120 TABLET | Refills: 0 | Status: SHIPPED | OUTPATIENT
Start: 2017-10-26 | End: 2017-11-21 | Stop reason: SDUPTHER

## 2017-11-06 RX ORDER — ERGOCALCIFEROL 1.25 MG/1
CAPSULE ORAL
Qty: 4 CAPSULE | Refills: 5 | Status: SHIPPED | OUTPATIENT
Start: 2017-11-06 | End: 2018-05-04 | Stop reason: SDUPTHER

## 2017-11-21 RX ORDER — OXYCODONE AND ACETAMINOPHEN 10; 325 MG/1; MG/1
1 TABLET ORAL EVERY 6 HOURS PRN
Qty: 120 TABLET | Refills: 0 | Status: SHIPPED | OUTPATIENT
Start: 2017-11-21 | End: 2017-12-06 | Stop reason: SDUPTHER

## 2017-11-29 RX ORDER — AZITHROMYCIN 250 MG/1
TABLET, FILM COATED ORAL
Qty: 6 TABLET | Refills: 0 | Status: SHIPPED | OUTPATIENT
Start: 2017-11-29 | End: 2018-01-03

## 2017-12-01 RX ORDER — CYANOCOBALAMIN 1000 UG/ML
INJECTION, SOLUTION INTRAMUSCULAR; SUBCUTANEOUS
Qty: 2 ML | Refills: 5 | Status: SHIPPED | OUTPATIENT
Start: 2017-12-01

## 2017-12-01 RX ORDER — LANSOPRAZOLE 30 MG/1
CAPSULE, DELAYED RELEASE ORAL
Qty: 30 CAPSULE | Refills: 5 | Status: SHIPPED | OUTPATIENT
Start: 2017-12-01 | End: 2018-05-04 | Stop reason: SDUPTHER

## 2017-12-06 ENCOUNTER — OFFICE VISIT (OUTPATIENT)
Dept: FAMILY MEDICINE CLINIC | Facility: CLINIC | Age: 72
End: 2017-12-06

## 2017-12-06 VITALS
BODY MASS INDEX: 22.33 KG/M2 | SYSTOLIC BLOOD PRESSURE: 140 MMHG | DIASTOLIC BLOOD PRESSURE: 82 MMHG | WEIGHT: 156 LBS | HEIGHT: 70 IN

## 2017-12-06 DIAGNOSIS — B35.1 ONYCHOMYCOSIS OF TOENAIL: Primary | ICD-10-CM

## 2017-12-06 DIAGNOSIS — G20 PARKINSON'S DISEASE (HCC): ICD-10-CM

## 2017-12-06 DIAGNOSIS — G89.29 OTHER CHRONIC PAIN: ICD-10-CM

## 2017-12-06 PROCEDURE — 99214 OFFICE O/P EST MOD 30 MIN: CPT | Performed by: FAMILY MEDICINE

## 2017-12-06 RX ORDER — OXYCODONE AND ACETAMINOPHEN 10; 325 MG/1; MG/1
1 TABLET ORAL EVERY 6 HOURS PRN
Qty: 120 TABLET | Refills: 0 | Status: SHIPPED | OUTPATIENT
Start: 2017-12-06 | End: 2018-01-17 | Stop reason: SDUPTHER

## 2017-12-06 RX ORDER — GABAPENTIN 100 MG/1
100 CAPSULE ORAL 3 TIMES DAILY
Qty: 90 CAPSULE | Refills: 2 | Status: SHIPPED | OUTPATIENT
Start: 2017-12-06 | End: 2018-03-06

## 2017-12-06 NOTE — PROGRESS NOTES
Subjective   Landry Gentile is a 72 y.o. male.  Patient is here today with his wife for follow-up and parkinsonism, with continued neurologic and overall decline.  He needs refill of pain medication.  He has a lot of joint pain.  He's continuing to lose weight and has a poor appetite.  For the last week or 2 he's had upper respiratory congestion with cough and drainage.  His wife also expresses concern about his toenail on his left foot.  He had the great toenail removed due to a severe fungal infection but it has grown back and is quite painful for him.  It's hard for him to wear shoes.    Arthritis   Presents for follow-up visit. He complains of pain, stiffness and joint swelling. He reports no joint warmth. The symptoms have been stable. Affected location: diffuse. His pain is at a severity of 8/10. Associated symptoms include dry mouth, fatigue, pain at night and pain while resting. Pertinent negatives include no diarrhea, dry eyes, dysuria, fever, rash, Raynaud's syndrome, uveitis or weight loss. Compliance with total regimen is %. Compliance with medications is %.        The following portions of the patient's history were reviewed and updated as appropriate: allergies, current medications, past family history, past medical history, past social history, past surgical history and problem list.    Review of Systems   Constitutional: Positive for activity change, appetite change and fatigue. Negative for diaphoresis, fever, unexpected weight change and weight loss.   HENT: Negative for congestion, ear pain, hearing loss, sinus pressure, sore throat, tinnitus, trouble swallowing and voice change.    Eyes: Negative.    Respiratory: Negative.    Cardiovascular: Negative.    Gastrointestinal: Negative for abdominal distention, abdominal pain, blood in stool, constipation, diarrhea, nausea and vomiting.        Excessive salivation, drooling   Endocrine: Negative.    Genitourinary: Positive for  difficulty urinating. Negative for decreased urine volume, dysuria, frequency, hematuria and urgency.   Musculoskeletal: Positive for arthralgias, arthritis, back pain, gait problem, joint swelling, myalgias and stiffness.   Skin: Negative.  Negative for rash.   Allergic/Immunologic: Negative.    Neurological: Positive for dizziness, tremors, speech difficulty and weakness. Negative for seizures, syncope, numbness and headaches.   Hematological: Negative.    Psychiatric/Behavioral: Positive for agitation, behavioral problems, confusion, decreased concentration, dysphoric mood and sleep disturbance. The patient is nervous/anxious.        Objective   Physical Exam   Constitutional: He is oriented to person, place, and time. He appears well-developed and well-nourished. No distress.   HENT:   Head: Normocephalic and atraumatic.   Nose: Nose normal.   Mouth/Throat: Oropharynx is clear and moist.   Continue salivation and drooling noted   Eyes: Conjunctivae and EOM are normal. Pupils are equal, round, and reactive to light.   Neck: Normal range of motion. Neck supple. No JVD present. No tracheal deviation present. No thyromegaly present.   Cardiovascular: Normal rate, regular rhythm, normal heart sounds and intact distal pulses.    No murmur heard.  Pulmonary/Chest: Effort normal and breath sounds normal. He has no wheezes. He exhibits no tenderness.   Abdominal: Soft. Bowel sounds are normal. He exhibits no distension and no mass. There is no tenderness.   Musculoskeletal: Normal range of motion. He exhibits edema. He exhibits no tenderness.   1+ edema both lower extremities in the pretibial areas and ankles   Lymphadenopathy:     He has no cervical adenopathy.   Neurological: He is alert and oriented to person, place, and time. He displays abnormal reflex. He exhibits abnormal muscle tone. Coordination abnormal.   Shuffling gait   Skin: Skin is warm and dry. No rash noted. There is erythema. No pallor.   Psychiatric:    Confused, flat affect   Nursing note and vitals reviewed.      Assessment/Plan   Landry was seen today for med refill.    Diagnoses and all orders for this visit:    Onychomycosis of toenail  -     Ambulatory Referral to Podiatry    Parkinson's disease    Other chronic pain    Other orders  -     oxyCODONE-acetaminophen (PERCOCET)  MG per tablet; Take 1 tablet by mouth Every 6 (Six) Hours As Needed for Moderate Pain .  -     gabapentin (NEURONTIN) 100 MG capsule; Take 1 capsule by mouth 3 (Three) Times a Day.    The patient has read and signed the [facility] Controlled Substance Contract.  I will continue to see patient for regular follow up appointments. Patient is well controlled on the medication.  JOSE RAFAEL has been reviewed by me and is updated every 3 months. The patient is aware of the potential for addiction and dependence.   Continue with Percocet on an as-needed basis for the back and joint pain for this patient with end-stage parkinsonism .  Continue gabapentin for neurologic pain issues as well    We'll make referral to podiatry for the onychomycosis with her current growth of the toenail that is painful.    He has been on appetite stimulants and other medications.  His wife understands that the parkinsonism has significantly progressed.  She continues to take excellent care of him at home and we will assist her as best we can.  Unfortunately, his decline will likely persist.

## 2018-01-03 ENCOUNTER — OFFICE VISIT (OUTPATIENT)
Dept: PODIATRY | Facility: CLINIC | Age: 73
End: 2018-01-03

## 2018-01-03 VITALS — HEIGHT: 70 IN | BODY MASS INDEX: 22.35 KG/M2 | WEIGHT: 156.09 LBS

## 2018-01-03 DIAGNOSIS — M79.674 GREAT TOE PAIN, RIGHT: Primary | ICD-10-CM

## 2018-01-03 PROCEDURE — 99213 OFFICE O/P EST LOW 20 MIN: CPT | Performed by: PODIATRIST

## 2018-01-03 NOTE — PROGRESS NOTES
Landry Gentile  1945  72 y.o. male     Patient presents today for right great toe pain.        Chief Complaint   Patient presents with   • Right Foot - toenail issue           History of Present Illness    Patient presents to clinic today for follow-up of his right great toe pain. Currently rates the pain as a 0/10. Pain in toe comes and goes. Hurts most when pressure is applied. Had total temporary nail avulsion performed back in September.  The nail has regrown thickened and discolored.        Past Medical History:   Diagnosis Date   • Acute upper respiratory infection    • Adhesive capsulitis of shoulder    • Backache    • Cervical radiculopathy    • Degeneration of lumbar intervertebral disc    • Dementia    • Drug-induced orofacial dyskinesia    • Encounter for screening for malignant neoplasm of prostate    • Hyperlipidemia    • Hypotension    • Low back pain    • Mouth ulcer    • Osteoarthritis of acromioclavicular joint    • Parkinson's disease    • Upper respiratory infection    • Urinary incontinence    • Vitamin B12 deficiency (non anemic)    • Vitamin D deficiency          Past Surgical History:   Procedure Laterality Date   • COLONOSCOPY  06/25/2012    Internal & external hemorrhoids found.   • CORONARY ARTERY BYPASS GRAFT  1996   • ENDOSCOPY  06/25/2012    Esophagitis seen. Biopsy taken. Gastritis in stomach. Biopsy taken. Normal duodenum. Biopsy taken.   • LUMBAR LAMINECTOMY  04/14/2011   • PROCEDURE GENERIC CONVERTED  07/21/2015    TOBACCO NON-USER          Family History   Problem Relation Age of Onset   • No Known Problems Mother    • No Known Problems Father    • No Known Problems Maternal Grandmother    • No Known Problems Maternal Grandfather    • No Known Problems Paternal Grandmother    • No Known Problems Paternal Grandfather          Social History     Social History   • Marital status:      Spouse name: N/A   • Number of children: N/A   • Years of education: N/A      Occupational History   • Not on file.     Social History Main Topics   • Smoking status: Never Smoker   • Smokeless tobacco: Never Used   • Alcohol use No   • Drug use: No   • Sexual activity: Not on file     Other Topics Concern   • Not on file     Social History Narrative         Current Outpatient Prescriptions   Medication Sig Dispense Refill   • aspirin 81 MG EC tablet Take 81 mg by mouth daily.     • carbidopa-levodopa (SINEMET)  MG per tablet 1 tablet up to 6 times a day as directed 180 tablet 5   • carvedilol (COREG) 3.125 MG tablet TAKE 1 TABLET BY MOUTH TWO TIMES A DAY WITH MEALS 60 tablet 5   • clopidogrel (PLAVIX) 75 MG tablet Take 75 mg by mouth daily.     • Cyanocobalamin (VITAMIN B-12 CR PO) Take  by mouth daily.     • cyanocobalamin 1000 MCG/ML injection Inject 1 mL into the shoulder, thigh, or buttocks Every 14 (Fourteen) Days. 2 mL 5   • diclofenac (VOLTAREN) 1 % gel gel Apply 4 g topically 4 (four) times a day. Apply to affected joints.     • finasteride (PROSCAR) 5 MG tablet   11   • gabapentin (NEURONTIN) 100 MG capsule Take 1 capsule by mouth 3 (Three) Times a Day. 90 capsule 2   • lansoprazole (PREVACID) 30 MG capsule TAKE 1 CAPSULE BY MOUTH EVERY DAY 30 capsule 5   • lidocaine (LIDODERM) 5 % Place  on the skin. wear up to 3 patches daily for 12 hours     • LINZESS 290 MCG capsule capsule TAKE 1 CAPSULE BY MOUTH DAILY 30 capsule 5   • methylcellulose, Laxative, (CITRUCEL) 500 MG tablet tablet Take 1 tablet by mouth daily.     • nitroglycerin (NITROSTAT) 0.4 MG SL tablet Place  under the tongue. 1 tablet(s) sublingual as needed for chest pain (may repeat every 5 minutes but seek medical help if pain persists after 3 tablets)     • olopatadine (PATADAY) 0.2 % solution ophthalmic solution Administer 1 drop to both eyes 2 (two) times a day.     • ondansetron (ZOFRAN) 4 MG tablet Take 4-8 mg by mouth every 4 (four) hours as needed for nausea.     • oxyCODONE-acetaminophen (PERCOCET)  " MG per tablet Take 1 tablet by mouth Every 6 (Six) Hours As Needed for Moderate Pain . 120 tablet 0   • pravastatin (PRAVACHOL) 40 MG tablet TAKE 1 TABLET BY MOUTH EVERY OTHER DAY 15 tablet 5   • QUEtiapine (SEROquel) 50 MG tablet TAKE 1 TABLET BY MOUTH EVERY NIGHT 30 tablet 5   • silodosin (RAPAFLO) 8 MG capsule capsule Take  by mouth.     • Syringe/Needle, Disp, (SYRINGE LUER LOCK) 25G X 1\" 3 ML misc Use to inject vitamin b 12 2 each 5   • vitamin D (ERGOCALCIFEROL) 81591 units capsule capsule TAKE 1 CAPSULE BY MOUTH ONCE A WEEK 4 capsule 5     No current facility-administered medications for this visit.          OBJECTIVE    Ht 177.8 cm (70\")  Wt 70.8 kg (156 lb 1.4 oz)  BMI 22.4 kg/m2      Review of Systems   Constitutional: Negative for chills and fever.   Cardiovascular: Negative for chest pain.   Gastrointestinal: Negative for constipation, diarrhea, nausea and vomiting.   Skin: Negative for wound.   Musculoskeletal: Negative foot pain      Constitutional: well developed, well nourished    HEENT: Normocephalic and atraumatic, normal hearing    Respiratory: Non labored respirations noted    Cardiovascular:    DP/PT pulses palpable    CFT brisk  to all digits  Skin temp is warm to warm from proximal tibia to distal digits  Pedal hair growth present.     Musculoskeletal:  No pain on palpation noted  Rectus foot type     Dermatological:   Right hallux nail is 3/4 regrown and is thickened and discolored. Slight tenderness to palpation.   Skin is warm, dry and intact    No subcutaneous nodules or masses noted    No open wounds noted     Neurological:   Sensation intact to light touch    DTR intact    Psychiatric: Alert, NAD.     Procedures        ASSESSMENT AND PLAN    Landry was seen today for toenail issue.    Diagnoses and all orders for this visit:    Great toe pain, right    - Lengthy discussion was held patient and family member regarding potential treatment options.  Discussed possibility of total " permanent nail avulsion. At this time we will take a wait-and-see approach as it is unclear exactly how much the toe actually bothers him  - nails was smoothed down with an electric .   - All questions were answered   - RTC as needed          This document has been electronically signed by Mo Ann DPM on January 3, 2018 12:53 PM     1/3/2018  12:53 PM

## 2018-01-17 RX ORDER — OXYCODONE AND ACETAMINOPHEN 10; 325 MG/1; MG/1
1 TABLET ORAL EVERY 6 HOURS PRN
Qty: 120 TABLET | Refills: 0 | Status: SHIPPED | OUTPATIENT
Start: 2018-01-17 | End: 2018-02-12 | Stop reason: SDUPTHER

## 2018-01-23 RX ORDER — SULFAMETHOXAZOLE AND TRIMETHOPRIM 800; 160 MG/1; MG/1
1 TABLET ORAL 2 TIMES DAILY
Qty: 14 TABLET | Refills: 0 | Status: SHIPPED | OUTPATIENT
Start: 2018-01-23 | End: 2018-05-23

## 2018-01-23 NOTE — TELEPHONE ENCOUNTER
[facility] called wanting ATB for recent UA, MD ordered Bactrim DS BID x 7 days and meds called into pharmacy

## 2018-01-29 RX ORDER — CARVEDILOL 3.12 MG/1
TABLET ORAL
Qty: 60 TABLET | Refills: 5 | Status: SHIPPED | OUTPATIENT
Start: 2018-01-29

## 2018-02-12 RX ORDER — OXYCODONE AND ACETAMINOPHEN 10; 325 MG/1; MG/1
1 TABLET ORAL EVERY 6 HOURS PRN
Qty: 120 TABLET | Refills: 0 | Status: SHIPPED | OUTPATIENT
Start: 2018-02-12 | End: 2018-03-06 | Stop reason: SDUPTHER

## 2018-02-28 RX ORDER — QUETIAPINE FUMARATE 50 MG/1
TABLET, FILM COATED ORAL
Qty: 30 TABLET | Refills: 5 | Status: SHIPPED | OUTPATIENT
Start: 2018-02-28

## 2018-03-06 ENCOUNTER — OFFICE VISIT (OUTPATIENT)
Dept: FAMILY MEDICINE CLINIC | Facility: CLINIC | Age: 73
End: 2018-03-06

## 2018-03-06 VITALS
HEIGHT: 60 IN | SYSTOLIC BLOOD PRESSURE: 104 MMHG | BODY MASS INDEX: 29.45 KG/M2 | WEIGHT: 150 LBS | DIASTOLIC BLOOD PRESSURE: 62 MMHG

## 2018-03-06 DIAGNOSIS — G20 PARKINSON'S DISEASE (HCC): Primary | ICD-10-CM

## 2018-03-06 DIAGNOSIS — M51.36 DEGENERATION OF LUMBAR INTERVERTEBRAL DISC: ICD-10-CM

## 2018-03-06 PROCEDURE — 99214 OFFICE O/P EST MOD 30 MIN: CPT | Performed by: FAMILY MEDICINE

## 2018-03-06 RX ORDER — OXYCODONE AND ACETAMINOPHEN 10; 325 MG/1; MG/1
1 TABLET ORAL EVERY 6 HOURS PRN
Qty: 120 TABLET | Refills: 0 | Status: SHIPPED | OUTPATIENT
Start: 2018-03-06 | End: 2018-04-13 | Stop reason: SDUPTHER

## 2018-03-06 NOTE — PROGRESS NOTES
Subjective   Landry Gentile is a 72 y.o. male.  Patient is here today with his wife for follow-up and parkinsonism, with continued neurologic and overall decline.  He needs refill of pain medication.  He has a lot of joint pain.  His wife said he does seem to have a good appetite but he still has lost a little weight.  Overall he still doing about the same as far as the Parkinson's goes.   The pain medication does help make him more comfortable but doesn't completely alleviate the pain and he still requires assistance with all use of daily living.  Arthritis   Presents for follow-up visit. He complains of pain, stiffness and joint swelling. He reports no joint warmth. The symptoms have been stable. Affected location: diffuse. His pain is at a severity of 8/10. Associated symptoms include dry mouth, fatigue, pain at night and pain while resting. Pertinent negatives include no diarrhea, dry eyes, dysuria, fever, rash, Raynaud's syndrome, uveitis or weight loss. Compliance with total regimen is %. Compliance with medications is %.        The following portions of the patient's history were reviewed and updated as appropriate: allergies, current medications, past family history, past medical history, past social history, past surgical history and problem list.    Review of Systems   Constitutional: Positive for activity change, appetite change and fatigue. Negative for diaphoresis, fever, unexpected weight change and weight loss.   HENT: Negative for congestion, ear pain, hearing loss, sinus pressure, sore throat, tinnitus, trouble swallowing and voice change.    Eyes: Negative.    Respiratory: Negative.    Cardiovascular: Negative.    Gastrointestinal: Negative for abdominal distention, abdominal pain, blood in stool, constipation, diarrhea, nausea and vomiting.        Excessive salivation, drooling   Endocrine: Negative.    Genitourinary: Positive for difficulty urinating. Negative for decreased urine  volume, dysuria, frequency, hematuria and urgency.   Musculoskeletal: Positive for arthralgias, arthritis, back pain, gait problem, joint swelling, myalgias and stiffness.   Skin: Negative.  Negative for rash.   Allergic/Immunologic: Negative.    Neurological: Positive for dizziness, tremors, speech difficulty and weakness. Negative for seizures, syncope, numbness and headaches.   Hematological: Negative.    Psychiatric/Behavioral: Positive for agitation, behavioral problems, confusion, decreased concentration, dysphoric mood and sleep disturbance. The patient is nervous/anxious.        Objective   Physical Exam   Constitutional: He is oriented to person, place, and time. He appears well-developed and well-nourished. No distress.   HENT:   Head: Normocephalic and atraumatic.   Nose: Nose normal.   Mouth/Throat: Oropharynx is clear and moist.   Continue salivation and drooling noted   Eyes: Conjunctivae and EOM are normal. Pupils are equal, round, and reactive to light.   Neck: Normal range of motion. Neck supple. No JVD present. No tracheal deviation present. No thyromegaly present.   Cardiovascular: Normal rate, regular rhythm, normal heart sounds and intact distal pulses.    No murmur heard.  Pulmonary/Chest: Effort normal and breath sounds normal. He has no wheezes. He exhibits no tenderness.   Abdominal: Soft. Bowel sounds are normal. He exhibits no distension and no mass. There is no tenderness.   Musculoskeletal: Normal range of motion. He exhibits edema. He exhibits no tenderness.   1+ edema both lower extremities in the pretibial areas and ankles   Lymphadenopathy:     He has no cervical adenopathy.   Neurological: He is alert and oriented to person, place, and time. He displays abnormal reflex. He exhibits abnormal muscle tone. Coordination abnormal.   Shuffling gait   Skin: Skin is warm and dry. No rash noted. There is erythema. No pallor.   Psychiatric:   Confused, flat affect   Nursing note and vitals  reviewed.      Assessment/Plan   Landry was seen today for follow-up and med refill.    Diagnoses and all orders for this visit:    Parkinson's disease    Degeneration of lumbar intervertebral disc    Other orders  -     oxyCODONE-acetaminophen (PERCOCET)  MG per tablet; Take 1 tablet by mouth Every 6 (Six) Hours As Needed for Moderate Pain .  The patient has read and signed the [facility] Controlled Substance Contract.  I will continue to see patient for regular follow up appointments. Patient is well controlled on the medication.  JOSE RAFAEL has been reviewed by me and is updated every 3 months. The patient is aware of the potential for addiction and dependence.   Continue with Percocet on an as-needed basis for the back and joint pain for this patient with end-stage parkinsonism .  Continue gabapentin for neurologic pain issues as well          This document has been electronically signed by Cristy Berg MD on March 6, 2018 5:36 PM

## 2018-03-29 RX ORDER — GABAPENTIN 100 MG/1
CAPSULE ORAL
Qty: 90 CAPSULE | Refills: 2 | OUTPATIENT
Start: 2018-03-29

## 2018-03-29 RX ORDER — PRAVASTATIN SODIUM 40 MG
TABLET ORAL
Qty: 15 TABLET | Refills: 5 | Status: SHIPPED | OUTPATIENT
Start: 2018-03-29

## 2018-03-29 RX ORDER — LINACLOTIDE 290 UG/1
CAPSULE, GELATIN COATED ORAL
Qty: 30 CAPSULE | Refills: 5 | Status: SHIPPED | OUTPATIENT
Start: 2018-03-29 | End: 2019-02-13 | Stop reason: SDUPTHER

## 2018-04-13 RX ORDER — OXYCODONE AND ACETAMINOPHEN 10; 325 MG/1; MG/1
1 TABLET ORAL EVERY 6 HOURS PRN
Qty: 120 TABLET | Refills: 0 | Status: SHIPPED | OUTPATIENT
Start: 2018-04-13 | End: 2018-05-10 | Stop reason: SDUPTHER

## 2018-04-23 RX ORDER — AZITHROMYCIN 250 MG/1
TABLET, FILM COATED ORAL
Qty: 60 TABLET | Refills: 0 | Status: SHIPPED | OUTPATIENT
Start: 2018-04-23 | End: 2018-05-23

## 2018-04-23 RX ORDER — BENZONATATE 100 MG/1
100 CAPSULE ORAL 3 TIMES DAILY PRN
Qty: 9 CAPSULE | Refills: 0 | Status: SHIPPED | OUTPATIENT
Start: 2018-04-23

## 2018-05-04 RX ORDER — ERGOCALCIFEROL 1.25 MG/1
CAPSULE ORAL
Qty: 4 CAPSULE | Refills: 5 | Status: SHIPPED | OUTPATIENT
Start: 2018-05-04

## 2018-05-04 RX ORDER — LANSOPRAZOLE 30 MG/1
CAPSULE, DELAYED RELEASE ORAL
Qty: 30 CAPSULE | Refills: 5 | Status: SHIPPED | OUTPATIENT
Start: 2018-05-04

## 2018-05-11 RX ORDER — OXYCODONE AND ACETAMINOPHEN 10; 325 MG/1; MG/1
1 TABLET ORAL EVERY 6 HOURS PRN
Qty: 120 TABLET | Refills: 0 | Status: SHIPPED | OUTPATIENT
Start: 2018-05-11

## 2018-05-15 RX ORDER — ONDANSETRON 4 MG/1
TABLET, FILM COATED ORAL
Qty: 60 TABLET | Refills: 5 | Status: SHIPPED | OUTPATIENT
Start: 2018-05-15

## 2018-09-27 RX ORDER — CARBIDOPA AND LEVODOPA 25; 100 MG/1; MG/1
TABLET, ORALLY DISINTEGRATING ORAL
Qty: 100 TABLET | Refills: 5 | Status: SHIPPED | OUTPATIENT
Start: 2018-09-27

## 2019-02-13 RX ORDER — LINACLOTIDE 290 UG/1
CAPSULE, GELATIN COATED ORAL
Qty: 30 CAPSULE | Refills: 5 | Status: SHIPPED | OUTPATIENT
Start: 2019-02-13 | End: 2019-08-07 | Stop reason: SDUPTHER

## 2019-08-07 RX ORDER — LINACLOTIDE 290 UG/1
CAPSULE, GELATIN COATED ORAL
Qty: 30 CAPSULE | Refills: 5 | Status: SHIPPED | OUTPATIENT
Start: 2019-08-07 | End: 2020-02-24

## 2020-02-24 RX ORDER — LINACLOTIDE 290 UG/1
CAPSULE, GELATIN COATED ORAL
Qty: 30 CAPSULE | Refills: 5 | Status: SHIPPED | OUTPATIENT
Start: 2020-02-24